# Patient Record
Sex: MALE | Race: WHITE | NOT HISPANIC OR LATINO | Employment: FULL TIME | URBAN - NONMETROPOLITAN AREA
[De-identification: names, ages, dates, MRNs, and addresses within clinical notes are randomized per-mention and may not be internally consistent; named-entity substitution may affect disease eponyms.]

---

## 2017-01-17 ENCOUNTER — HISTORY (OUTPATIENT)
Dept: FAMILY MEDICINE | Facility: OTHER | Age: 27
End: 2017-01-17

## 2017-01-17 ENCOUNTER — OFFICE VISIT - GICH (OUTPATIENT)
Dept: FAMILY MEDICINE | Facility: OTHER | Age: 27
End: 2017-01-17

## 2017-01-17 DIAGNOSIS — L05.91 PILONIDAL CYST WITHOUT ABSCESS: ICD-10-CM

## 2017-01-19 ENCOUNTER — HISTORY (OUTPATIENT)
Dept: SURGERY | Facility: OTHER | Age: 27
End: 2017-01-19

## 2017-01-19 ENCOUNTER — OFFICE VISIT - GICH (OUTPATIENT)
Dept: SURGERY | Facility: OTHER | Age: 27
End: 2017-01-19

## 2017-01-19 DIAGNOSIS — L05.91 PILONIDAL CYST WITHOUT ABSCESS: ICD-10-CM

## 2017-01-20 ENCOUNTER — COMMUNICATION - GICH (OUTPATIENT)
Dept: SURGERY | Facility: OTHER | Age: 27
End: 2017-01-20

## 2017-01-24 ENCOUNTER — OFFICE VISIT - GICH (OUTPATIENT)
Dept: FAMILY MEDICINE | Facility: OTHER | Age: 27
End: 2017-01-24

## 2017-01-24 ENCOUNTER — HISTORY (OUTPATIENT)
Dept: FAMILY MEDICINE | Facility: OTHER | Age: 27
End: 2017-01-24

## 2017-01-24 DIAGNOSIS — L05.91 PILONIDAL CYST WITHOUT ABSCESS: ICD-10-CM

## 2018-01-03 NOTE — PATIENT INSTRUCTIONS
Patient Information     Patient Name MRN Sex Berry Hunt [id number] Male 1990      Patient Instructions by Mike Fonseca MD at 2017 10:57 AM     Author:  Mike Fonseca MD  Service:  (none) Author Type:  Physician     Filed:  2017 10:59 AM  Encounter Date:  2017 Status:  Addendum     :  Mike Fonseca MD (Physician)        Related Notes: Original Note by Mike Fonseca MD (Physician) filed at 2017 10:57 AM            Warm soaks a few times daily  Augmentin twice a day  Off work a couple days  Will try and arrange appointment for Thursday if needed       Index Related topics   Tailbone Cyst (Pilonidal Cyst)   What is a tailbone cyst?   A tailbone cyst is a fluid-filled sac in the crease between the buttocks, near the tailbone. It often contains hair and small pieces of skin. It can get infected and cause an abscess, which is a pocket of pus. Pus is a thick fluid that usually contains white blood cells, dead tissue, and germs.  A tailbone cyst is also called a pilonidal cyst.  What is the cause?  These cysts are often caused by an ingrown hair (hair that grows under the skin instead of on top of the skin). Ingrown hairs happen, for example, because of pressure or friction from tight clothes, sitting, or riding a bicycle for a long time. The ingrown hair irritates the tissue under the skin and causes a cyst to form around the hair.  You may be more likely to have a pilonidal cyst if you were born with a little dimple in the skin between the buttocks.   What are the symptoms?   Symptoms may include:    Pain when you sit    Redness and swelling in the area of your tailbone in or just above the crease between your buttocks    Pus oozing from the swollen area    Tenderness when the swollen area is touched    Rarely fever, weakness, or nausea  The size of the pilonidal cyst may range from a small tender dimple to a large painful area.  How is it diagnosed?  Your  healthcare provider will ask about your symptoms and medical history and examine you.  How is it treated?   You should not try open or squeeze a cyst yourself. This can make the infection worse or cause it to spread.  If there is no infection, your healthcare provider may remove the hair around the cyst and remove any ingrown hair.  If the cyst gets infected, usually it needs to be drained by your healthcare provider. This treats the infection and gets rid of the pressure that causes pain. Even if the cyst is not infected, opening it and draining it is recommended to relieve pain and prevent infection. In some cases your provider may remove the whole cyst. A pilonidal cyst can return after treatment.  Your provider will discuss your choices for treatment.   How can I take care of myself?  Before you see your healthcare provider for treatment, it can help to:    Soak in a tub of warm water for 10 to 15 minutes, several times a day, to lessen pain. Sometimes the cyst may open on its own.    Keep the area clean and dry.    Take nonprescription pain medicine to relieve pain.  After the cyst is drained, make sure that you follow all of your healthcare provider's instructions.  Ask your provider:    How long it will take to recover    If there are activities you should avoid and when you can return to your normal activities    How to take care of yourself at home    What symptoms or problems you should watch for and what to do if you have them  Make sure you know when you should come back for a checkup. Keep all appointments for provider visits or tests.  How can I help prevent a pilonidal cyst?   To help prevent another cyst:    Keep the area between the buttocks dry and clean.    Keep hair out of the area by shaving or using a hair removal cream.  You can get more information from:    The American Society of Colon and Rectal  Surgeons  938.191.6469  https://www.fascrs.org/patients/disease-condition/pilonidal-disease  Developed by [employer].  Adult Advisor 2016.2 published by [employer].  Last modified: 2015-05-08  Last reviewed: 2014-07-23  This content is reviewed periodically and is subject to change as new health information becomes available. The information is intended to inform and educate and is not a replacement for medical evaluation, advice, diagnosis or treatment by a healthcare professional.  References   Adult Advisor 2016.2 Index    Copyright   2016 [employer], a division of [employer] All rights reserved.

## 2018-01-03 NOTE — NURSING NOTE
Patient Information     Patient Name MRN Berry Conn [id number] Male 1990      Nursing Note by Ebony Turner at 2017 10:00 AM     Author:  Ebony Turner Service:  (none) Author Type:  (none)     Filed:  2017 10:29 AM Encounter Date:  2017 Status:  Signed     :  Ebony Turner            Berry Del Cid is a 26 y.o. male presenting with a rectal problem. He reports having painful hemorrhoids.  Ebony Turner LPN 2017 10:19 AM

## 2018-01-03 NOTE — PATIENT INSTRUCTIONS
Patient Information     Patient Name MRN Sex Berry Hunt [id number] Male 1990      Patient Instructions by Daysi Cheng DO at 2017  2:30 PM     Author:  Daysi Cheng DO  Service:  (none) Author Type:  PHYS- Osteopathic     Filed:  2017  2:43 PM  Encounter Date:  2017 Status:  Addendum     :  Dayis Cheng DO (PHYS- Osteopathic)        Related Notes: Original Note by Daysi Cheng DO (PHYS- Osteopathic) filed at 2017  2:57 PM            Caring for Your Incision      -yogurt daily while on antibiotics     Home Care Instructions after Rectal Surgery  Pain/muscle spasms are normal after surgery.  Use the prescribed pain medications, valium and topical creams.  Do not use any other creams unless specifically prescribed for you.  Do not take the pain medication and valium at the same time.  It takes oral pain meds an hour to take effect.  Do not get behind on your pain meds.  You can alternate with NSAID s (ibuprofen 400-600mg) every 8 hours.  Take with food; do not take if you have ulcers or sensitivity to aspirin.  Constipation occurs with the use of narcotic pain medications. The first bowel movement after surgery will be painful.  Do not let yourself get constipated.  Stay on a stool softener while you are on the narcotics.  It is recommended that you use a fiber supplement (Metamucil, Citrucel) daily   (1 tablespoon in 8 oz of water).  If you do not have a bowel movement daily, use Milk of Magnesia or Miralax.  It is normal to have bleeding or drainage after rectal surgery; especially when you move your bowels.  Use a sanitary napkin to collect the discharge. If you are passing large clots or having to change the pad more than every 4 hours, call the clinic or go to the ER.  You may experience spasms in the rectal muscles.  This is normal after surgery and last for about two weeks.  They can become more intense with bowel movements.  .  You can also try  sitz bathes (sitting in a bath tub of PLAIN lukewarm water; soap can add to rectal irritation).  Or you can try ice packs.  You will have to see what works best for you.  It is ok to shower.    Use a pillow to sit on.  Follow a mild bland diet.  Avoid alcohol, spicy food, citrus, and tomatoes.  Avoid strenuous activity (running, jogging, and power walking, swimming, weight lifting) for two weeks.   No driving if taking pain medications, or cannot turn or twist your body without hesitation.  You can return to work when you are no longer taking the pain meds, can sit comfortably for 8 hours or when the weight  restrictions are lifted.  Urinary retention is common. Sit in a warm tub to try to relax the bladder.  If you are unable to urinate after 8 hours, call the office or go to the ER.  If you have packing in place, follow the directions for doing the dressing changes.            Pain Control    Use ice!  Ice keeps the swelling down and swelling is what causes pain.  Never apply ice directly to the skin.  Wrap it in a towel or cloth.  Apply ice 20 minutes on and 20 minutes off for pain control.  Use as needed.    Take tylenol 325 mg by mouth with food every 4 hours as needed for pain. Or ibuprofen 400 mg by mouth with food every 4 hours as needed for pain.  Do not take tylenol if you have a history of heavy drinking , hepatits C or liver problems.  Do not take ibuprofen if you have a history of stomach ulcers/problems, bleeding problem or kidney issues.       Home care  Always wash your hands before touching your incision  Do not to pick at the scabs. Scabs help protect the wound.  You can take a shower in 24 hours and wash the incision with soap and water. Pat dry/don t scrub. It s OK to wash around the incision. But don t spray water directly on it.  Wash with antibacterial soap and water 2-3x/day and after BM.  Check the incision site daily for pain, redness, drainage, swelling, or separation of the incision  edges.  If there is a bandage (dressing) over the incision, leave the dressing in place until you are told to remove it or change it. Using clean hands change the dressing as directed by your healthcare provider. Always wash your hands before changing your dressing.  Make sure any clothing that touches the incision is loose-fitting. This will prevent rubbing. If the incision is on the head, keep your child from wearing caps or other head coverings. These may rub against the incision.  Try to avoid from rough play, contact sports, or physical activities. This can put you at risk of opening the incision.  Make sure you avoid doing things that could cause dirt or sweat to get in or on the incision.  As your incision heals, the skin may appear pink or red. It may also feel slightly bumpy or raised. This is called a healing ridge. Over time, the color should fade and the raised skin will become less noticeable.            Call your healthcare provider right away if you have any of these:  More pain, redness, swelling, bleeding, or foul-smelling discharge around the incision area  Fever of 101 F (38.3 C) or higher, or as directed by your child's healthcare provider  Shaking chills  Vomiting or nausea that doesn t go away  Numbness, coldness, or tingling around the incision area, or changes in skin color

## 2018-01-03 NOTE — PROGRESS NOTES
Patient Information     Patient Name MRN Sex Berry Hunt [id number] Male 1990      Progress Notes by Mike Fonseca MD at 2017 10:00 AM     Author:  Mike Fonseca MD Service:  (none) Author Type:  Physician     Filed:  2017  2:52 PM Encounter Date:  2017 Status:  Signed     :  Mike Fonseca MD (Physician)            SUBJECTIVE:  26 y.o. male presents for rectal area pain for 2 d. No fever. He is uncomfortable and does not want to sit.    Had a hemorrhoid before, thinks it is like that. No rectal bleeding.    No current outpatient prescriptions on file.     Allergies as of 2017      (No Known Allergies)       OBJECTIVE:  Visit Vitals       /70     Pulse 68     Wt 99.8 kg (220 lb)     BMI 32.49 kg/m2       General Appearance: uncomfortable with pain  Rectal Exam: Exam difficult due to discomfort. Redness and slight erythema in superior gluteal fold. No head to abscess noted. I cannot see well enough to see proximity to rectum.  Given Toradol 60 mg IM. Eventually able to examine more thoroughly and it does not appear to be a perirectal abscess. More likely pilonidal cyst.    ASSESSMENT/PLAN:    ICD-10-CM   1. Infected pilonidal cyst L05.91     Appears to have early infected pilonidal cyst. Nothing to I&D today.   Placed on Augmentin. Warm soaks to help increase blood flow to the area with antibiotics   Likely to form abscess and recommend follow up in 2-3 d to reassess.   I am not in clinic at the end of the week, so arranged follow up with general surgery in case he requires I&D.

## 2018-01-03 NOTE — NURSING NOTE
Patient Information     Patient Name MRN Sex Berry Hunt [id number] Male 1990      Nursing Note by Ebony Turner at 2017  2:15 PM     Author:  Ebony Turner Service:  (none) Author Type:  (none)     Filed:  2017  2:30 PM Encounter Date:  2017 Status:  Signed     :  Ebony Turner            Berry Del Cid is a 26 y.o. male presenting for follow up on his surgery. He needs a note to return to work.  Ebony Turner LPN 2017 2:21 PM

## 2018-01-03 NOTE — PROGRESS NOTES
Patient Information     Patient Name MRN Sex Berry Hunt [id number] Male 1990      Progress Notes by Daysi Cheng DO at 2017  2:30 PM     Author:  Daysi Cheng DO Service:  (none) Author Type:  PHYS- Osteopathic     Filed:  2017  2:44 PM Encounter Date:  2017 Status:  Signed     :  Daysi Cheng DO (PHYS- Osteopathic)            OFFICE CONSULTATION NOTE  Patient Name: Berry Del Cid  Address: [address] [place] [id number]  Age:26 y.o.  Sex: male     Primary Care Physician: jose ramon COLLIER was requested to see this patient in consultation by No Pcp for evaluation of infected pilonidal cyst    HPI:   The patient is an 26 y.o. male here today in clinic for evaluation of infected pilonidal cyst  Started on .  Has been on augmentin for days.  slt better.  Never had this problems before/.  No on blood thinners.  No diabetes mellitus . No recent steriods.     CONSULTATION ASSESSMENT AND PLAN/RECOMMENDATIONS:   Problem List Items Addressed This Visit     None      Visit Diagnoses     Infected pilonidal cyst        Relevant Medications    HYDROcodone-acetaminophen, 5-325 mg, (NORCO) per tablet          Clinic Procedure Note      PMx, PSx, and social Hx are reviewed and updated in [facility]    Current Outpatient Prescriptions on File Prior to Visit       Medication  Sig Dispense Refill     amoxicillin-clavulanate 875-125 mg tablet (AUGMENTIN) Take 1 tablet by mouth 2 times daily with meals for 7 days. 14 tablet 0     No current facility-administered medications on file prior to visit.        Indication  Berry Del Cid is seen at the request of the  Pcp.  Berry Del Cid  presents for lesion removal. We have discussed this procedure, including option  of not performing surgery, technique of surgery and potential for  bleeding/infection/scarring/need for removal of more tissue and post-procedural care.  Patient is able to do post procedural dressing changes  and understands what is  expected.    OBJECTIVE:    Berry Del Cid appears well. Vitals are normal. The patient has no allergies to lidocaine or  suture material. The patient not on any blood thinners.  Informed consent was obtained prior to beginning the procedure. The area was marked  and doubled checked/ID ed with the pt. PAUSE for the CAUSE completed. The risks of  lesion removal include but are not limited to: bleeding, infection, scarring, reoccurrence,  and the need for removal of more tissue, and complications of anesthesia.    Location  ASSESSMENT: The lesion is 4x2Cm in size. Color:red/hot/swollen/painful    Borders: irreg  Differential diagnosis includes:infected pilonidail cyst    Location:   cleft     Procedural Sedation  1% lidocaine w/ Epinephrine  20 cc    Technique  Patient appears well. Vitals are normal. The patient has no allergies to lidocaine or  suture material. The patient not on any blood thinners.  Berry Del Cid has no history of keloids or problems with healing in the past.    Skin: see HPI    Informed consent was obtained prior to beginning the procedure. The area was marked  and doubled checked/ID ed with the pt. PAUSE for the CAUSE completed. The risks of  lesion removal include but are not limited to: bleeding, infection, scarring, reoccurrence,  and the need for removal of more tissue, and complications of anesthesia.  After informed consent was obtained, using Chloroprep for cleansing and 1%   Lidocaine w/ epi for anesthetic; using sterile technique, PROCEDURE: I & D of asbcess  was performed.    The lesion is 4x2cm in size. The incision was 2Cm long.  Packed wet top dry.  Patient did not tolerate this well.     Patient is on antibiotics.  and wound care instructions provided. The procedure was well tolerated without complications.    Plan:  The patient will follow up in 7 days for wound check  If thereis any bleeding/redness/drainage/swelling/pain or tenderness- call the  clinic. Patient  was given instructions in wound care, acivity, warning signs, appointment for follow up.  If the patient has any questions or concerns, should call our clinic or go to ER/urgent  care after hours. Patient expressed understanding in directions for care, and was given a  written copy of instructions.    Rx=see EPIC    Pt tolerated the procedure well without complications  Patient was given instruction in activity restrictions, wound care and dressing changes. Medication usage, diet, warning signs to look for (and what to do if they occur), and an appointment for follow-up.      Caring for Your Incision          .    Home care  Always wash your hands before touching your incision.    Do not to pick at the scabs. Scabs help protect the wound.  You can take a shower in 24 hours and wash the incision with soap and water. Pat dry/don t scrub. It s OK to wash around the incision. But don t spray water directly on it.  Pat stitches dry if they get wet. Don't rub.  Check the incision site daily for pain, redness, drainage, swelling, or separation of the incision edges.  If there is a bandage (dressing) over the incision, leave the dressing in place until you are told to remove it or change it. Using clean hands change the dressing as directed by your healthcare provider. Always wash your hands before changing your dressing.  Make sure any clothing that touches the incision is loose-fitting. This will prevent rubbing.   Try to avoid from rough play, contact sports, or physical activities. This can put you at risk of opening the incision.  Make sure you avoid doing things that could cause dirt or sweat to get in or on the incision.  As your incision heals, the skin may appear pink or red. It may also feel slightly bumpy or raised. This is called a healing ridge. Over time, the color should fade and the raised skin will become less noticeable.            Pain Control    Use ice!  Ice keeps the swelling down and  "swelling is what causes pain.  Never apply ice directly to the skin.  Wrap it in a towel or cloth.  Apply ice 20 minutes on and 20 minutes off for pain control.  Use as needed.    Take tylenol 325 mg by mouth with food every 4 hours as needed for pain. Or ibuprofen 400 mg by mouth with food every 4 hours as needed for pain.  Do not take tylenol if you have a history of heavy drinking , hepatits C or liver problems.  Do not take ibuprofen if you have a history of stomach ulcers/problems, bleeding problem or kidney issues.         Wash with soap and water 2-3 times per day and after bowel movement  Remove packing in the am  Yogurt BID while on antibiotics  Follow up 1 week  Will require definitive surgery.  Patient has work issues and can't take time off for 6m.  Can do after that point.                PAST MEDICAL HISTORY  No past medical history on file.   PAST SURGICAL HISTORY  No past surgical history on file.   CURRENT MEDS  Current Outpatient Prescriptions on File Prior to Visit       Medication  Sig Dispense Refill     amoxicillin-clavulanate 875-125 mg tablet (AUGMENTIN) Take 1 tablet by mouth 2 times daily with meals for 7 days. 14 tablet 0     No current facility-administered medications on file prior to visit.      ALLERGIES/SENSITIVITIES  No Known Allergies  FAMILY HISTORY  No family history on file.   SOCIAL HISTORY  Social History     Social History        Marital status:       Spouse name: N/A     Number of children:  N/A     Years of education:  N/A     Occupational History      Not on file.     Social History Main Topics       Smoking status: Never Smoker     Smokeless tobacco: Current User     Alcohol use Not on file     Drug use: Not on file     Sexual activity: Not on file     Other Topics  Concern     Not on file      Social History Narrative     **pre upload 02/04/2013**        PHYSICAL EXAM  /80  Ht 1.753 m (5' 9\")  Wt 102.1 kg (225 lb)  BMI 33.23 kg/m2    GENERAL: Healthy appearing " patient in no acute distress. Pleasant and cooperative with exam and interview.   HEENT: Head-normocephalic. Eyes-no scleral icterus, pupils equal, round, and reactive to light. Nose-no nasal drainage. No lesions. Mouth-oral mucosa pink and moist, no lesions.    LYMPHATICS:  No cervical, axillary or supraclavicular adenopathy.  CV: Regular rate and rhythm, no murmurs. No peripheral edema.  LUNGS:  No respiratory distress. Clear bilaterally to auscultation.  ABDOMEN: Non distended. Bowel sounds active. Soft, non-tender, no hepatosplenomegaly or hernias. No peritoneal signs.  rectum infected infected pilonidal cyst 4x2cm redness/hot/painfeull/swollen    SKIN: Pink, warm and dry. No jaundice. No rash.  NEURO:  Cranial nerves II-XII grossly intact. Alert and oriented.  PSYCH: Appropriate mood and affect.      No results found for any previous visit.        IMAGING  none    Daysi Cheng, DO

## 2018-01-03 NOTE — PROGRESS NOTES
Patient Information     Patient Name MRN Sex Berry Hunt [id number] Male 1990      Progress Notes by Mike Fonseca MD at 2017  2:15 PM     Author:  Mike Fonseca MD Service:  (none) Author Type:  Physician     Filed:  2017  4:57 PM Encounter Date:  2017 Status:  Signed     :  Mike Fonseca MD (Physician)            SUBJECTIVE:  26 y.o. male here for follow up on pilonidal cyst. He received antibiotics a week ago. Completing Augmentin today. Had I&D 5 d ago. No drainage. Wondering about exercise and return to work    REVIEW OF SYSTEMS:    Constitutional: Negative        Current Outpatient Prescriptions       Medication  Sig Dispense Refill     amoxicillin-clavulanate 875-125 mg tablet (AUGMENTIN) Take 1 tablet by mouth 2 times daily with meals for 7 days. 14 tablet 0     Allergies as of 2017      (No Known Allergies)       OBJECTIVE:  Visit Vitals       /80     Pulse 64     Wt 102.5 kg (226 lb)     BMI 33.37 kg/m2       Skin: Healing abscess/open incision on L upper gluteal fold. No surrounding erythema. Has some drainage matted together.      ASSESSMENT/PLAN:    ICD-10-CM   1. Infected pilonidal cyst L05.91     Resolving. Complete antibiotics, no further indicated. Showering and bath okay, but stay out of hot tub and pool until closed wound.  Given note to return to work. May want to avoid running or lots of chafing for another week or so for healing.     F/U PRN

## 2018-01-03 NOTE — TELEPHONE ENCOUNTER
Patient Information     Patient Name MRN Sex Berry Hunt [id number] Male 1990      Telephone Encounter by Heather Rodriguez at 2017  8:59 AM     Author:  Heather Rodriguez Service:  (none) Author Type:  (none)     Filed:  2017  9:01 AM Encounter Date:  2017 Status:  Signed     :  Heather Rodriguez            Patient needs to be seen sooner than Thursday.  Dr Cheng told patient to come in on Tuesday.   Pt is suppose to work Thursday the day shift.   Do you want to have him see some another provider or can you work in on Tuesday?  Please call wife Magali.  Thanks    Heather Rodriguez ....................  2017   9:01 AM

## 2018-01-03 NOTE — TELEPHONE ENCOUNTER
Patient Information     Patient Name MRN Sex     Berry Del Cid [id number] Male 1990      Telephone Encounter by Maci Mullins at 2017  9:18 AM     Author:  Maci Mullins Service:  (none) Author Type:  (none)     Filed:  2017  9:19 AM Encounter Date:  2017 Status:  Signed     :  Maci Mullins            Schedulers will call patient so he can be seen in the afternoon on, 17 with Dr. Cheng for follow up infected pilonidal.  Maci Mullins LPN..........2017  9:18 AM

## 2018-01-23 ENCOUNTER — HOSPITAL ENCOUNTER (OUTPATIENT)
Dept: RADIOLOGY | Facility: OTHER | Age: 28
End: 2018-01-23
Attending: INTERNAL MEDICINE

## 2018-01-23 ENCOUNTER — HISTORY (OUTPATIENT)
Dept: INTERNAL MEDICINE | Facility: OTHER | Age: 28
End: 2018-01-23

## 2018-01-23 ENCOUNTER — OFFICE VISIT - GICH (OUTPATIENT)
Dept: INTERNAL MEDICINE | Facility: OTHER | Age: 28
End: 2018-01-23

## 2018-01-23 DIAGNOSIS — M25.579 PAIN IN ANKLE: ICD-10-CM

## 2018-01-23 ASSESSMENT — PATIENT HEALTH QUESTIONNAIRE - PHQ9: SUM OF ALL RESPONSES TO PHQ QUESTIONS 1-9: 0

## 2018-01-25 ENCOUNTER — DOCUMENTATION ONLY (OUTPATIENT)
Dept: FAMILY MEDICINE | Facility: OTHER | Age: 28
End: 2018-01-25

## 2018-01-25 PROBLEM — L05.91 INFECTED PILONIDAL CYST: Status: ACTIVE | Noted: 2017-01-20

## 2018-01-27 VITALS — WEIGHT: 226 LBS | DIASTOLIC BLOOD PRESSURE: 80 MMHG | HEART RATE: 64 BPM | SYSTOLIC BLOOD PRESSURE: 120 MMHG

## 2018-01-27 VITALS
HEIGHT: 69 IN | BODY MASS INDEX: 33.33 KG/M2 | DIASTOLIC BLOOD PRESSURE: 70 MMHG | WEIGHT: 225 LBS | SYSTOLIC BLOOD PRESSURE: 110 MMHG | BODY MASS INDEX: 32.49 KG/M2 | SYSTOLIC BLOOD PRESSURE: 102 MMHG | WEIGHT: 220 LBS | HEART RATE: 68 BPM | DIASTOLIC BLOOD PRESSURE: 80 MMHG

## 2018-02-09 VITALS
SYSTOLIC BLOOD PRESSURE: 136 MMHG | BODY MASS INDEX: 33.2 KG/M2 | WEIGHT: 224.8 LBS | DIASTOLIC BLOOD PRESSURE: 82 MMHG | HEART RATE: 64 BPM

## 2018-02-11 ASSESSMENT — PATIENT HEALTH QUESTIONNAIRE - PHQ9: SUM OF ALL RESPONSES TO PHQ QUESTIONS 1-9: 0

## 2018-02-13 NOTE — PROGRESS NOTES
Patient Information     Patient Name MRN Sex Berry Hunt [id number] Male 1990      Progress Notes by Piyush Kilgore MD at 2018  2:40 PM     Author:  Piyush Kilgore MD Service:  (none) Author Type:  Physician     Filed:  2018  3:10 PM Encounter Date:  2018 Status:  Signed     :  Piyush Kilgore MD (Physician)            SUBJECTIVE:    Berry Del Cid is a 27 y.o. male who presents for ankle pain.    HPI Comments: He is here today with complaint of pain in his left ankle. This is been for the last week and a half or so. He admits that he's been doing a lot more at the gym lately including a lot of lower body exertional exercises and he has also increased his routine on the treadmill. He has not increased the incline but he has increased the distance and the speed of the treadmill. It is now painful just to have a blood on. It doesn't necessarily hurt anymore with standing. He hasn't tried anything for this. He is here today to have this evaluated.      No Known Allergies,   No current outpatient prescriptions on file.     No current facility-administered medications for this visit.      Medications have been reviewed by me and are current to the best of my knowledge and ability. ,   Past Medical History:     Diagnosis  Date     Infected pilonidal cyst 2017    and   Patient Active Problem List      Diagnosis Date Noted     Infected pilonidal cyst 2017       REVIEW OF SYSTEMS:  Review of Systems   All other systems reviewed and are negative.      OBJECTIVE:  /82 (Cuff Site: Right Arm, Position: Sitting, Cuff Size: Adult Large)  Pulse 64  Wt 102 kg (224 lb 12.8 oz)  BMI 33.2 kg/m2    EXAM:   Physical Exam   Constitutional: He is well-developed, well-nourished, and in no distress. No distress.   Musculoskeletal:        Feet:    Skin: He is not diaphoretic.   Nursing note and vitals reviewed.      ASSESSMENT/PLAN:    ICD-10-CM    1. Ankle pain, unspecified  chronicity, unspecified laterality M25.579 XR ANKLE 3 VIEWS LEFT      meloxicam (MOBIC) 15 mg tablet        Plan:  X-ray today is negative. I think he probably has an overuse injury with tendinitis or similar. This was reviewed with him. He needs to back off on the exercise until this heals. Mobic 15 mg daily as well as ice or heat to the area. Return if not improving.

## 2018-02-13 NOTE — NURSING NOTE
Patient Information     Patient Name MRN Sex Berry Hunt [id number] Male 1990      Nursing Note by Ivana Fernandez LPN at 2018  2:40 PM     Author:  Ivana Fernandez LPN Service:  (none) Author Type:  NURS- Licensed Practical Nurse     Filed:  2018  2:47 PM Encounter Date:  2018 Status:  Signed     :  Ivana Fernandez LPN (NURS- Licensed Practical Nurse)            The patient is here today to have his left ankle looked at. He reports it is painful 5/10.  Ivana Fernandez LPN......2018  2:38 PM

## 2018-03-20 ENCOUNTER — OFFICE VISIT (OUTPATIENT)
Dept: FAMILY MEDICINE | Facility: OTHER | Age: 28
End: 2018-03-20
Attending: FAMILY MEDICINE
Payer: COMMERCIAL

## 2018-03-20 VITALS
BODY MASS INDEX: 32.58 KG/M2 | WEIGHT: 220 LBS | HEIGHT: 69 IN | DIASTOLIC BLOOD PRESSURE: 80 MMHG | SYSTOLIC BLOOD PRESSURE: 128 MMHG | HEART RATE: 74 BPM

## 2018-03-20 DIAGNOSIS — L05.91 PILONIDAL CYST: Primary | ICD-10-CM

## 2018-03-20 PROCEDURE — 99213 OFFICE O/P EST LOW 20 MIN: CPT | Performed by: FAMILY MEDICINE

## 2018-03-20 RX ORDER — MELOXICAM 15 MG/1
15 TABLET ORAL DAILY
COMMUNITY
Start: 2018-01-23 | End: 2018-03-20

## 2018-03-20 NOTE — MR AVS SNAPSHOT
After Visit Summary   3/20/2018    Berry Del Cid    MRN: [id number]           Patient Information     Date Of Birth          1990        Visit Information        Provider Department      3/20/2018 3:15 PM Raeann Raygoza MD [facility]        Today's Diagnoses     Pilonidal cyst    -  1       Follow-ups after your visit        Additional Services     GENERAL SURG ADULT REFERRAL       Your provider has referred you to: GI: [facility] [phone]   [email or web address]    Please be aware that coverage of these services is subject to the terms and limitations of your health insurance plan.  Call member services at your health plan with any benefit or coverage questions.      Please bring the following with you to your appointment:    (1) Any X-Rays, CTs or MRIs which have been performed.  Contact the facility where they were done to arrange for  prior to your scheduled appointment.   (2) List of current medications   (3) This referral request   (4) Any documents/labs given to you for this referral                  Who to contact     If you have questions or need follow up information about today's clinic visit or your schedule please contact [facility] directly at [phone].  Normal or non-critical lab and imaging results will be communicated to you by MyChart, letter or phone within 4 business days after the clinic has received the results. If you do not hear from us within 7 days, please contact the clinic through MyChart or phone. If you have a critical or abnormal lab result, we will notify you by phone as soon as possible.  Submit refill requests through [employer] or call your pharmacy and they will forward the refill request to us. Please allow 3 business days for your refill to be completed.          Additional Information About Your Visit        MyChart Information     MyChart  "lets you send messages to your doctor, view your test results, renew your prescriptions, schedule appointments and more. To sign up, go to [email or web address] . Click on \"Log in\" on the left side of the screen, which will take you to the Welcome page. Then click on \"Sign up Now\" on the right side of the page.     You will be asked to enter the access code listed below, as well as some personal information. Please follow the directions to create your username and password.     Your access code is: [email or web address]  Expires: 2018  3:48 PM     Your access code will  in 90 days. If you need help or a new code, please call your [facility] clinic or [phone].        Care EveryWhere ID     This is your Care EveryWhere ID. This could be used by other organizations to access your [facility] medical records  [id number]        Your Vitals Were     Pulse Height BMI (Body Mass Index)             74 5' 9\" (1.753 m) 32.49 kg/m2          Blood Pressure from Last 3 Encounters:   18 128/80   18 136/82   17 120/80    Weight from Last 3 Encounters:   18 220 lb (99.8 kg)   18 224 lb 12.8 oz (102 kg)   17 226 lb (102.5 kg)              We Performed the Following     GENERAL SURG ADULT REFERRAL          Today's Medication Changes          These changes are accurate as of 3/20/18  3:48 PM.  If you have any questions, ask your nurse or doctor.               Start taking these medicines.        Dose/Directions    amoxicillin-clavulanate 875-125 MG per tablet   Commonly known as:  AUGMENTIN   Used for:  Pilonidal cyst   Started by:  Raeann Raygoza MD        Dose:  1 tablet   Take 1 tablet by mouth 2 times daily for 10 days   Quantity:  20 tablet   Refills:  0            Where to get your medicines      Some of these will need a paper prescription and others can be bought over the counter.  Ask your nurse if you have questions.     Bring a paper prescription for each of these " medications     amoxicillin-clavulanate 875-125 MG per tablet                Primary Care Provider Fax #    Physician No Ref-Primary [phone]       No address on file        Equal Access to Services     JEFERSON PEREZ : Hadii aad ku haddalia Mcmanus, maribell muhammad, kahlil kashelbi moore, be garner laYasmanijennifer jason. So [facility] [phone].    ATENCIÓN: Si habla español, tiene a maciel disposición servicios gratuitos de asistencia lingüística. Llame al [phone].    We comply with applicable federal civil rights laws and [place] laws. We do not discriminate on the basis of race, color, national origin, age, disability, sex, sexual orientation, or gender identity.            Thank you!     Thank you for choosing [facility] AND [facility]  for your care. Our goal is always to provide you with excellent care. Hearing back from our patients is one way we can continue to improve our services. Please take a few minutes to complete the written survey that you may receive in the mail after your visit with us. Thank you!             Your Updated Medication List - Protect others around you: Learn how to safely use, store and throw away your medicines at www.disposemymeds.org.          This list is accurate as of 3/20/18  3:48 PM.  Always use your most recent med list.                   Brand Name Dispense Instructions for use Diagnosis    amoxicillin-clavulanate 875-125 MG per tablet    AUGMENTIN    20 tablet    Take 1 tablet by mouth 2 times daily for 10 days    Pilonidal cyst

## 2018-03-20 NOTE — PROGRESS NOTES
"  SUBJECTIVE:   Berry Del Cid is a 27 year old male who presents to clinic today for possible recurrent pilonidal cyst.  First started having issues with it about a year ago.  Had it lanced last year.  He was not able to sit at all at that time.  He is in the  and has been doing a lot of sit ups.  Wonders if this has caused it to flare again lately.  No fever.  Has noted some bloody drainage in underwear again lately.  A little tender now, but not like it had been before.    HPI      Patient Active Problem List    Diagnosis Date Noted     Infected pilonidal cyst 01/20/2017     Priority: Medium     Past Medical History:   Diagnosis Date     Pilonidal cyst without abscess     1/20/2017      History reviewed. No pertinent surgical history.  History reviewed. No pertinent family history.  Social History   Substance Use Topics     Smoking status: Never Smoker     Smokeless tobacco: Current User     Alcohol use Not on file     Social History     Social History Narrative    **pre upload 02/04/2013**     Current Outpatient Prescriptions   Medication Sig Dispense Refill     amoxicillin-clavulanate (AUGMENTIN) 875-125 MG per tablet Take 1 tablet by mouth 2 times daily for 10 days 20 tablet 0     No Known Allergies    Review of Systems   Constitutional: Negative for activity change, appetite change and fever.        OBJECTIVE:     /80 (BP Location: Right arm, Patient Position: Sitting, Cuff Size: Adult Large)  Pulse 74  Ht 5' 9\" (1.753 m)  Wt 220 lb (99.8 kg)  BMI 32.49 kg/m2  Body mass index is 32.49 kg/(m^2).  Physical Exam   Constitutional: He appears well-developed.   Musculoskeletal:   There is some dried blood in gluteal cleft. He does not allow me to do an adequate exam due to pain.       PHQ-2 Score:     PHQ-2 ( 1999 Pfizer) 3/20/2018   Q1: Little interest or pleasure in doing things 0   Q2: Feeling down, depressed or hopeless 0   PHQ-2 Score 0         Diagnostic Test Results:  none "     ASSESSMENT/PLAN:       ICD-10-CM    1. Pilonidal cyst L05.91 GENERAL SURG ADULT REFERRAL     amoxicillin-clavulanate (AUGMENTIN) 875-125 MG per tablet       1.  Referred to general surgery for consideration of excision of cyst.  He was given a new prescription for Augmentin to have on hand if getting worse.    Raeann Raygoza MD  [facility]

## 2018-03-21 ENCOUNTER — OFFICE VISIT (OUTPATIENT)
Dept: SURGERY | Facility: OTHER | Age: 28
End: 2018-03-21
Attending: FAMILY MEDICINE
Payer: COMMERCIAL

## 2018-03-21 VITALS — WEIGHT: 220.4 LBS | DIASTOLIC BLOOD PRESSURE: 80 MMHG | SYSTOLIC BLOOD PRESSURE: 136 MMHG | BODY MASS INDEX: 32.55 KG/M2

## 2018-03-21 DIAGNOSIS — L05.91 PILONIDAL CYST: ICD-10-CM

## 2018-03-21 DIAGNOSIS — L05.91 INFECTED PILONIDAL CYST: Primary | ICD-10-CM

## 2018-03-21 PROCEDURE — 99212 OFFICE O/P EST SF 10 MIN: CPT | Performed by: SURGERY

## 2018-03-21 ASSESSMENT — ENCOUNTER SYMPTOMS
ACTIVITY CHANGE: 0
FEVER: 0
APPETITE CHANGE: 0

## 2018-03-21 ASSESSMENT — PAIN SCALES - GENERAL: PAINLEVEL: NO PAIN (0)

## 2018-03-21 NOTE — PROGRESS NOTES
Surgical Clinic Consult  Referring physician:  Dr Raygoza  Primary physician:     No Ref-Primary, Physician    Chief complaint:   Infected pilonidal cyst    History of present illness:  This is a 27 year old male I am seeing in consultation for an infected pilonidal cyst.  Patient had a pilonidal cyst incised and drained in January 2017.  Over the past week patient has had progressive pain and drainage.  He is preparing for a fitness test in May.  He is very frightened about thoughts of an open wound due to the pain of the drainage that occurred last year.    Past medical history:   Past Medical History:   Diagnosis Date     Pilonidal cyst without abscess     1/20/2017       Pastsurgical history:  No past surgical history on file.    Current medications:  Current Outpatient Prescriptions   Medication Sig Dispense Refill     amoxicillin-clavulanate (AUGMENTIN) 875-125 MG per tablet Take 1 tablet by mouth 2 times daily for 10 days 20 tablet 0       Allergies:  No Known Allergies    Family history:  No family history on file.    Social history:  Social History     Social History     Marital status:      Spouse name: N/A     Number of children: N/A     Years of education: N/A     Occupational History     Not on file.     Social History Main Topics     Smoking status: Never Smoker     Smokeless tobacco: Current User     Alcohol use Not on file     Drug use: Not on file     Sexual activity: Not on file     Other Topics Concern     Not on file     Social History Narrative    **pre upload 02/04/2013**       PROBLEM LIST:  Patient Active Problem List   Diagnosis     Infected pilonidal cyst     Review of systems:  COMPLETE REVIEW OF SYSTEMS: Denies problems  Gastrointestinal: Denies problems  Cardiovascular: Denies problems  Respiratory: Denies problems  Genitourinary: Denies problems  Musculoskeletal: Denies problems  Skin: See HPI  Neurologic: Denies problems  Psychiatric: Denies problems  Endocrine: Denies  problems  Heme/Lymphatic: Denies problems  Vascular: Denies problems      Physical exam: /80 (BP Location: Right arm, Patient Position: Sitting, Cuff Size: Adult Large)  Wt 220 lb 6.4 oz (100 kg)  BMI 32.55 kg/m2      General: this is a pleasant male patient in no acute distress.  Patient is awake alert and oriented x3 .   Respiratory: Clear without wheezes or crackles  Cardiovascular: Regular rate and rhythm without murmurs  Sacrum: Hypertrophic granulation tissue just to the right of midline with purulent exudate.  Midline pits.  Area tender but without erythema.    Assessment:   Infected pilonidal cyst/sinus.  We discussed alternatives and treatment and I recommended open procedure to  heal by secondary intention as the area is very inflamed/infected.     Plan:    Pilonidal cystectomy under general anesthesia as a day surgery.  Patient understands the risks to include bleeding, infection, recurrence, and the need to heal by secondary intention and wishes to proceed.       Francisco Javier Suero MD FACS

## 2018-03-21 NOTE — NURSING NOTE
Patient presents to clinic to have a consult for a pilonidal cyst.  Deidre Pineda LPN  3/21/2018  3:55 PM

## 2018-03-21 NOTE — MR AVS SNAPSHOT
"              After Visit Summary   3/21/2018    Berry Del Cid    MRN: [id number]           Patient Information     Date Of Birth          1990        Visit Information        Provider Department      3/21/2018 3:50 PM Francisco Javier Suero MD [facility]        Today's Diagnoses     Infected pilonidal cyst    -  1    Pilonidal cyst           Follow-ups after your visit        Your next 10 appointments already scheduled     Apr 04, 2018  3:30 PM CDT   Return Visit with Francisco Javier Suero MD   [facility]    [address]  [place] [address] [phone]              Who to contact     If you have questions or need follow up information about today's clinic visit or your schedule please contact [facility] directly at [phone].  Normal or non-critical lab and imaging results will be communicated to you by [employer], letter or phone within 4 business days after the clinic has received the results. If you do not hear from us within 7 days, please contact the clinic through [employer] or phone. If you have a critical or abnormal lab result, we will notify you by phone as soon as possible.  Submit refill requests through [employer] or call your pharmacy and they will forward the refill request to us. Please allow 3 business days for your refill to be completed.          Additional Information About Your Visit        MyChart Information     [employer] lets you send messages to your doctor, view your test results, renew your prescriptions, schedule appointments and more. To sign up, go to [email or web address] . Click on \"Log in\" on the left side of the screen, which will take you to the Welcome page. Then click on \"Sign up Now\" on the right side of the page.     You will be asked to enter the access code listed below, as well as some personal information. Please follow the directions to create your username and " password.     Your access code is: [email or web address]  Expires: 2018  3:48 PM     Your access code will  in 90 days. If you need help or a new code, please call your [facility] or [phone].        Care EveryWhere ID     This is your Care EveryWhere ID. This could be used by other organizations to access your [facility] medical records  [id number]        Your Vitals Were     BMI (Body Mass Index)                   32.55 kg/m2            Blood Pressure from Last 3 Encounters:   18 136/80   18 128/80   18 136/82    Weight from Last 3 Encounters:   18 220 lb 6.4 oz (100 kg)   18 220 lb (99.8 kg)   18 224 lb 12.8 oz (102 kg)              Today, you had the following     No orders found for display       Primary Care Provider Fax #    Physician No Ref-Primary [phone]       No address on file        Equal Access to Services     ESTELA [facility] : Hadii amanda ku hadasho Soomaali, waaxda luqadaha, qaybta kaalmada adeegyada, waxay betiin ray arteaga . So [facility] [phone].    ATENCIÓN: Si habla español, tiene a maciel disposición servicios gratuitos de asistencia lingüística. Llame al [phone].    We comply with applicable federal civil rights laws and [place] laws. We do not discriminate on the basis of race, color, national origin, age, disability, sex, sexual orientation, or gender identity.            Thank you!     Thank you for choosing [facility] [place]  for your care. Our goal is always to provide you with excellent care. Hearing back from our patients is one way we can continue to improve our services. Please take a few minutes to complete the written survey that you may receive in the mail after your visit with us. Thank you!             Your Updated Medication List - Protect others around you: Learn how to safely use, store and throw away your medicines at www.disposemymeds.org.          This list is accurate as of 3/21/18  5:06 PM.   Always use your most recent med list.                   Brand Name Dispense Instructions for use Diagnosis    amoxicillin-clavulanate 875-125 MG per tablet    AUGMENTIN    20 tablet    Take 1 tablet by mouth 2 times daily for 10 days    Pilonidal cyst

## 2018-03-26 ENCOUNTER — ANESTHESIA EVENT (OUTPATIENT)
Dept: SURGERY | Facility: OTHER | Age: 28
End: 2018-03-26
Payer: COMMERCIAL

## 2018-03-27 ENCOUNTER — SURGERY (OUTPATIENT)
Age: 28
End: 2018-03-27

## 2018-03-27 ENCOUNTER — MYC MEDICAL ADVICE (OUTPATIENT)
Dept: SURGERY | Facility: OTHER | Age: 28
End: 2018-03-27

## 2018-03-27 ENCOUNTER — ANESTHESIA (OUTPATIENT)
Dept: SURGERY | Facility: OTHER | Age: 28
End: 2018-03-27
Payer: COMMERCIAL

## 2018-03-27 ENCOUNTER — HOSPITAL ENCOUNTER (OUTPATIENT)
Facility: OTHER | Age: 28
Discharge: HOME | End: 2018-03-27
Attending: SURGERY | Admitting: SURGERY
Payer: COMMERCIAL

## 2018-03-27 VITALS
DIASTOLIC BLOOD PRESSURE: 88 MMHG | TEMPERATURE: 97.7 F | RESPIRATION RATE: 17 BRPM | HEART RATE: 70 BPM | OXYGEN SATURATION: 95 % | SYSTOLIC BLOOD PRESSURE: 131 MMHG

## 2018-03-27 DIAGNOSIS — L05.91 INFECTED PILONIDAL CYST: Primary | ICD-10-CM

## 2018-03-27 PROCEDURE — 37000009 ZZH ANESTHESIA TECHNICAL FEE, EACH ADDTL 15 MIN: Performed by: SURGERY

## 2018-03-27 PROCEDURE — 11770 EXC PILONIDAL CYST SIMPLE: CPT | Performed by: ANESTHESIOLOGY

## 2018-03-27 PROCEDURE — 27210794 ZZH OR GENERAL SUPPLY STERILE: Performed by: SURGERY

## 2018-03-27 PROCEDURE — 11770 EXC PILONIDAL CYST SIMPLE: CPT | Performed by: SURGERY

## 2018-03-27 PROCEDURE — 25000128 H RX IP 250 OP 636: Performed by: NURSE ANESTHETIST, CERTIFIED REGISTERED

## 2018-03-27 PROCEDURE — 40000306 ZZH STATISTIC PRE PROC ASSESS II: Performed by: SURGERY

## 2018-03-27 PROCEDURE — 25000125 ZZHC RX 250: Performed by: SURGERY

## 2018-03-27 PROCEDURE — 71000014 ZZH RECOVERY PHASE 1 LEVEL 2 FIRST HR: Performed by: SURGERY

## 2018-03-27 PROCEDURE — 27210995 ZZH RX 272: Performed by: SURGERY

## 2018-03-27 PROCEDURE — 71000027 ZZH RECOVERY PHASE 2 EACH 15 MINS: Performed by: SURGERY

## 2018-03-27 PROCEDURE — 36000052 ZZH SURGERY LEVEL 2 EA 15 ADDTL MIN: Performed by: SURGERY

## 2018-03-27 PROCEDURE — 25000125 ZZHC RX 250: Performed by: NURSE ANESTHETIST, CERTIFIED REGISTERED

## 2018-03-27 PROCEDURE — 11770 EXC PILONIDAL CYST SIMPLE: CPT | Performed by: NURSE ANESTHETIST, CERTIFIED REGISTERED

## 2018-03-27 PROCEDURE — 88304 TISSUE EXAM BY PATHOLOGIST: CPT

## 2018-03-27 PROCEDURE — 36000050 ZZH SURGERY LEVEL 2 1ST 30 MIN: Performed by: SURGERY

## 2018-03-27 PROCEDURE — 37000008 ZZH ANESTHESIA TECHNICAL FEE, 1ST 30 MIN: Performed by: SURGERY

## 2018-03-27 PROCEDURE — 25000128 H RX IP 250 OP 636: Performed by: SURGERY

## 2018-03-27 RX ORDER — ONDANSETRON 2 MG/ML
INJECTION INTRAMUSCULAR; INTRAVENOUS PRN
Status: DISCONTINUED | OUTPATIENT
Start: 2018-03-27 | End: 2018-03-27

## 2018-03-27 RX ORDER — PROPOFOL 10 MG/ML
INJECTION, EMULSION INTRAVENOUS PRN
Status: DISCONTINUED | OUTPATIENT
Start: 2018-03-27 | End: 2018-03-27

## 2018-03-27 RX ORDER — CEFAZOLIN SODIUM 2 G/100ML
2 INJECTION, SOLUTION INTRAVENOUS
Status: COMPLETED | OUTPATIENT
Start: 2018-03-27 | End: 2018-03-27

## 2018-03-27 RX ORDER — OXYCODONE AND ACETAMINOPHEN 5; 325 MG/1; MG/1
1 TABLET ORAL 2 TIMES DAILY PRN
Qty: 18 TABLET | Refills: 0 | Status: SHIPPED | OUTPATIENT
Start: 2018-03-27 | End: 2018-04-18

## 2018-03-27 RX ORDER — NALOXONE HYDROCHLORIDE 0.4 MG/ML
.1-.4 INJECTION, SOLUTION INTRAMUSCULAR; INTRAVENOUS; SUBCUTANEOUS
Status: DISCONTINUED | OUTPATIENT
Start: 2018-03-27 | End: 2018-03-27 | Stop reason: HOSPADM

## 2018-03-27 RX ORDER — ACETAMINOPHEN 10 MG/ML
INJECTION, SOLUTION INTRAVENOUS PRN
Status: DISCONTINUED | OUTPATIENT
Start: 2018-03-27 | End: 2018-03-27

## 2018-03-27 RX ORDER — MEPERIDINE HYDROCHLORIDE 50 MG/ML
12.5 INJECTION INTRAMUSCULAR; INTRAVENOUS; SUBCUTANEOUS
Status: DISCONTINUED | OUTPATIENT
Start: 2018-03-27 | End: 2018-03-27 | Stop reason: HOSPADM

## 2018-03-27 RX ORDER — MAGNESIUM HYDROXIDE 1200 MG/15ML
LIQUID ORAL PRN
Status: DISCONTINUED | OUTPATIENT
Start: 2018-03-27 | End: 2018-03-27 | Stop reason: HOSPADM

## 2018-03-27 RX ORDER — PROPOFOL 10 MG/ML
INJECTION, EMULSION INTRAVENOUS CONTINUOUS PRN
Status: DISCONTINUED | OUTPATIENT
Start: 2018-03-27 | End: 2018-03-27

## 2018-03-27 RX ORDER — ACETAMINOPHEN 325 MG/1
650 TABLET ORAL 4 TIMES DAILY
Refills: 0 | COMMUNITY
Start: 2018-03-27 | End: 2018-04-18

## 2018-03-27 RX ORDER — KETOROLAC TROMETHAMINE 30 MG/ML
INJECTION, SOLUTION INTRAMUSCULAR; INTRAVENOUS PRN
Status: DISCONTINUED | OUTPATIENT
Start: 2018-03-27 | End: 2018-03-27

## 2018-03-27 RX ORDER — FENTANYL CITRATE 50 UG/ML
INJECTION, SOLUTION INTRAMUSCULAR; INTRAVENOUS PRN
Status: DISCONTINUED | OUTPATIENT
Start: 2018-03-27 | End: 2018-03-27

## 2018-03-27 RX ORDER — HYDROMORPHONE HYDROCHLORIDE 1 MG/ML
.3-.5 INJECTION, SOLUTION INTRAMUSCULAR; INTRAVENOUS; SUBCUTANEOUS EVERY 10 MIN PRN
Status: DISCONTINUED | OUTPATIENT
Start: 2018-03-27 | End: 2018-03-27 | Stop reason: HOSPADM

## 2018-03-27 RX ORDER — ONDANSETRON 4 MG/1
4 TABLET, ORALLY DISINTEGRATING ORAL EVERY 30 MIN PRN
Status: DISCONTINUED | OUTPATIENT
Start: 2018-03-27 | End: 2018-03-27 | Stop reason: HOSPADM

## 2018-03-27 RX ORDER — DEXAMETHASONE SODIUM PHOSPHATE 4 MG/ML
4 INJECTION, SOLUTION INTRA-ARTICULAR; INTRALESIONAL; INTRAMUSCULAR; INTRAVENOUS; SOFT TISSUE EVERY 10 MIN PRN
Status: DISCONTINUED | OUTPATIENT
Start: 2018-03-27 | End: 2018-03-27 | Stop reason: HOSPADM

## 2018-03-27 RX ORDER — SODIUM CHLORIDE, SODIUM LACTATE, POTASSIUM CHLORIDE, CALCIUM CHLORIDE 600; 310; 30; 20 MG/100ML; MG/100ML; MG/100ML; MG/100ML
INJECTION, SOLUTION INTRAVENOUS CONTINUOUS
Status: DISCONTINUED | OUTPATIENT
Start: 2018-03-27 | End: 2018-03-27 | Stop reason: HOSPADM

## 2018-03-27 RX ORDER — LIDOCAINE 40 MG/G
CREAM TOPICAL
Status: DISCONTINUED | OUTPATIENT
Start: 2018-03-27 | End: 2018-03-27 | Stop reason: HOSPADM

## 2018-03-27 RX ORDER — KETAMINE HYDROCHLORIDE 50 MG/ML
INJECTION, SOLUTION INTRAMUSCULAR; INTRAVENOUS PRN
Status: DISCONTINUED | OUTPATIENT
Start: 2018-03-27 | End: 2018-03-27

## 2018-03-27 RX ORDER — KETOROLAC TROMETHAMINE 30 MG/ML
30 INJECTION, SOLUTION INTRAMUSCULAR; INTRAVENOUS EVERY 6 HOURS PRN
Status: DISCONTINUED | OUTPATIENT
Start: 2018-03-27 | End: 2018-03-27 | Stop reason: HOSPADM

## 2018-03-27 RX ORDER — HYDROCODONE BITARTRATE AND ACETAMINOPHEN 5; 325 MG/1; MG/1
1 TABLET ORAL
Status: DISCONTINUED | OUTPATIENT
Start: 2018-03-27 | End: 2018-03-27 | Stop reason: HOSPADM

## 2018-03-27 RX ORDER — FENTANYL CITRATE 50 UG/ML
25-50 INJECTION, SOLUTION INTRAMUSCULAR; INTRAVENOUS
Status: DISCONTINUED | OUTPATIENT
Start: 2018-03-27 | End: 2018-03-27 | Stop reason: HOSPADM

## 2018-03-27 RX ORDER — ONDANSETRON 2 MG/ML
4 INJECTION INTRAMUSCULAR; INTRAVENOUS EVERY 30 MIN PRN
Status: DISCONTINUED | OUTPATIENT
Start: 2018-03-27 | End: 2018-03-27 | Stop reason: HOSPADM

## 2018-03-27 RX ORDER — FENTANYL CITRATE 50 UG/ML
25-50 INJECTION, SOLUTION INTRAMUSCULAR; INTRAVENOUS EVERY 5 MIN PRN
Status: DISCONTINUED | OUTPATIENT
Start: 2018-03-27 | End: 2018-03-27 | Stop reason: HOSPADM

## 2018-03-27 RX ORDER — LIDOCAINE HYDROCHLORIDE 20 MG/ML
INJECTION, SOLUTION INFILTRATION; PERINEURAL PRN
Status: DISCONTINUED | OUTPATIENT
Start: 2018-03-27 | End: 2018-03-27

## 2018-03-27 RX ORDER — BUPIVACAINE HYDROCHLORIDE 2.5 MG/ML
INJECTION, SOLUTION EPIDURAL; INFILTRATION; INTRACAUDAL PRN
Status: DISCONTINUED | OUTPATIENT
Start: 2018-03-27 | End: 2018-03-27 | Stop reason: HOSPADM

## 2018-03-27 RX ORDER — IBUPROFEN 600 MG/1
600 TABLET, FILM COATED ORAL 4 TIMES DAILY
Refills: 0 | COMMUNITY
Start: 2018-03-27 | End: 2018-04-18

## 2018-03-27 RX ADMIN — BUPIVACAINE HYDROCHLORIDE 30 ML: 2.5 INJECTION, SOLUTION EPIDURAL; INFILTRATION; INTRACAUDAL; PERINEURAL at 10:00

## 2018-03-27 RX ADMIN — ACETAMINOPHEN 1000 MG: 10 INJECTION, SOLUTION INTRAVENOUS at 09:56

## 2018-03-27 RX ADMIN — ONDANSETRON HYDROCHLORIDE 4 MG: 2 SOLUTION INTRAMUSCULAR; INTRAVENOUS at 09:33

## 2018-03-27 RX ADMIN — FENTANYL CITRATE 50 MCG: 50 INJECTION, SOLUTION INTRAMUSCULAR; INTRAVENOUS at 09:32

## 2018-03-27 RX ADMIN — KETOROLAC TROMETHAMINE 30 MG: 30 INJECTION, SOLUTION INTRAMUSCULAR at 09:54

## 2018-03-27 RX ADMIN — FENTANYL CITRATE 50 MCG: 50 INJECTION, SOLUTION INTRAMUSCULAR; INTRAVENOUS at 09:50

## 2018-03-27 RX ADMIN — MIDAZOLAM HYDROCHLORIDE 2 MG: 1 INJECTION, SOLUTION INTRAMUSCULAR; INTRAVENOUS at 09:30

## 2018-03-27 RX ADMIN — PROPOFOL 200 MG: 10 INJECTION, EMULSION INTRAVENOUS at 09:33

## 2018-03-27 RX ADMIN — SODIUM CHLORIDE, SODIUM LACTATE, POTASSIUM CHLORIDE, AND CALCIUM CHLORIDE: 600; 310; 30; 20 INJECTION, SOLUTION INTRAVENOUS at 08:06

## 2018-03-27 RX ADMIN — SODIUM CHLORIDE 100 ML: 900 IRRIGANT IRRIGATION at 10:05

## 2018-03-27 RX ADMIN — PROPOFOL 200 MCG/KG/MIN: 10 INJECTION, EMULSION INTRAVENOUS at 09:39

## 2018-03-27 RX ADMIN — LIDOCAINE HYDROCHLORIDE 100 MG: 20 INJECTION, SOLUTION INFILTRATION; PERINEURAL at 09:33

## 2018-03-27 RX ADMIN — KETAMINE HYDROCHLORIDE 30 MG: 50 INJECTION, SOLUTION INTRAMUSCULAR; INTRAVENOUS at 09:32

## 2018-03-27 RX ADMIN — FENTANYL CITRATE 50 MCG: 50 INJECTION, SOLUTION INTRAMUSCULAR; INTRAVENOUS at 10:05

## 2018-03-27 RX ADMIN — SUCCINYLCHOLINE CHLORIDE 200 MG: 20 INJECTION, SOLUTION INTRAMUSCULAR; INTRAVENOUS at 09:33

## 2018-03-27 RX ADMIN — CEFAZOLIN SODIUM 2 G: 2 INJECTION, SOLUTION INTRAVENOUS at 09:30

## 2018-03-27 NOTE — INTERVAL H&P NOTE
The history and physical has been reviewed and the patient has been examined.  There are no interim changes to the patient's history or physical condition.    Francisco Javier Suero MD

## 2018-03-27 NOTE — IP AVS SNAPSHOT
[facility]    [address]    [place] [address]    Phone:  [phone]    Fax:  [phone]                                       After Visit Summary   3/27/2018    Berry Del Cid    MRN: [id number]           After Visit Summary Signature Page     I have received my discharge instructions, and my questions have been answered. I have discussed any challenges I see with this plan with the nurse or doctor.    ..........................................................................................................................................  Patient/Patient Representative Signature      ..........................................................................................................................................  Patient Representative Print Name and Relationship to Patient    ..................................................               ................................................  Date                                            Time    ..........................................................................................................................................  Reviewed by Signature/Title    ...................................................              ..............................................  Date                                                            Time

## 2018-03-27 NOTE — OR NURSING
PACU Transfer Note    Berry Del Cid was transferred to DSU via cart.  Equipment used for transport:  none.  Accompanied by:  RN  Report to RIZWAN Nunez    PACU Respiratory Event Documentation     1) Episodes of Apnea greater than or equal to 10 seconds: 0    2) Bradypnea - less than 8 breaths per minute: 0    3) Pain score on 0 to 10 scale: 0    4) Pain-sedation mismatch (yes or no): no    5) Repeated 02 desaturation less than 90% (yes or no): no    Anesthesia notified? (yes or no): no    Any of the above events occuring repeatedly in separate 30 minute intervals may be considered recurrent PACU respiratory events.    Patient stable and meets phase 1 discharge criteria for transport from PACU.

## 2018-03-27 NOTE — ANESTHESIA POSTPROCEDURE EVALUATION
Patient: Berry Del Cid    Procedure(s):  Pilonidal Cystectomy - Wound Class: III-Contaminated    Diagnosis:pilonidal cyst  Diagnosis Additional Information: No value filed.    Anesthesia Type:  General, ETT    Note:  Anesthesia Post Evaluation    Patient location during evaluation: PACU  Patient participation: Able to fully participate in evaluation  Level of consciousness: awake and alert  Pain management: adequate  Airway patency: patent  Cardiovascular status: acceptable  Respiratory status: acceptable  Hydration status: acceptable  PONV: none     Anesthetic complications: None          Last vitals:  Vitals:    03/27/18 1055 03/27/18 1100 03/27/18 1115   BP: 126/84 118/78 131/88   Pulse:      Resp: 17     Temp: 97.7  F (36.5  C)     SpO2: 94% 95% 95%         Electronically Signed By: Jhonatan Snell DO  March 27, 2018  2:36 PM

## 2018-03-27 NOTE — ANESTHESIA PREPROCEDURE EVALUATION
Anesthesia Evaluation     .             ROS/MED HX    ENT/Pulmonary:  - neg pulmonary ROS     Neurologic:  - neg neurologic ROS     Cardiovascular:  - neg cardiovascular ROS       METS/Exercise Tolerance:     Hematologic:  - neg hematologic  ROS       Musculoskeletal:  - neg musculoskeletal ROS       GI/Hepatic:  - neg GI/hepatic ROS       Renal/Genitourinary:  - ROS Renal section negative       Endo:  - neg endo ROS       Psychiatric:  - neg psychiatric ROS       Infectious Disease:  - neg infectious disease ROS       Malignancy:      - no malignancy   Other:    (+) No chance of pregnancy C-spine cleared: N/A, no H/O Chronic Pain,no other significant disability                    Physical Exam  Normal systems: cardiovascular, pulmonary and dental    Airway   Mallampati: II  TM distance: >3 FB  Neck ROM: full    Dental     Cardiovascular       Pulmonary                     Anesthesia Plan      History & Physical Review      ASA Status:  2 .    NPO Status:  > 6 hours    Plan for General and ETT with Intravenous and Propofol induction. Maintenance will be TIVA.    PONV prophylaxis:  Ondansetron (or other 5HT-3) and Dexamethasone or Solumedrol       Postoperative Care  Postoperative pain management:  IV analgesics and Oral pain medications.      Consents  Anesthetic plan, risks, benefits and alternatives discussed with: .  Use of blood products discussed: No .   .                         .

## 2018-03-27 NOTE — IP AVS SNAPSHOT
MRN:[id number]                      After Visit Summary   3/27/2018    Berry Del Cid    MRN: [id number]           Thank you!     Thank you for choosing [facility] for your care. Our goal is always to provide you with excellent care. Hearing back from our patients is one way we can continue to improve our services. Please take a few minutes to complete the written survey that you may receive in the mail after you visit with us. Thank you!        Patient Information     Date Of Birth          1990        Designated Caregiver       Most Recent Value    Caregiver    Will someone help with your care after discharge? yes    Name of designated caregiver wife      About your hospital stay     You were admitted on:  March 27, 2018 You last received care in the:  [facility]    You were discharged on:  March 27, 2018       Who to Call     For medical emergencies, please call 911.  For non-urgent questions about your medical care, please call your primary care provider or clinic, None  For questions related to your surgery, please call your surgery clinic        Attending Provider     Provider Specialty    Francisco Javier Suero MD Surgery       Primary Care Provider Fax #    Physician No Ref-Primary [phone]      After Care Instructions     Diet Instructions       Resume pre-procedure diet            Dressing       Remove entire dressing tomorrow and then shower daily. NS wet-to-dry dressing changes twice daily.            Ice to affected area       Ice to operative site PRN            No driving or operating machinery        until the day after procedure                  Your next 10 appointments already scheduled     Apr 04, 2018  3:30 PM CDT   Return Visit with Francisco Javier Suero MD   [facility] ([facility])    [address]  [facility] [id number] [phone]              Pending Results     No orders found from 3/25/2018 to  "3/28/2018.            Admission Information     Date & Time Provider Department Dept. Phone    3/27/2018 Francisco Javier Suero MD [facility] [phone]      Your Vitals Were     Blood Pressure Pulse Temperature Respirations Pulse Oximetry       118/78 70 97.7  F (36.5  C) 17 94%       MyChart Information     [employer] lets you send messages to your doctor, view your test results, renew your prescriptions, schedule appointments and more. To sign up, go to [email or web address] . Click on \"Log in\" on the left side of the screen, which will take you to the Welcome page. Then click on \"Sign up Now\" on the right side of the page.     You will be asked to enter the access code listed below, as well as some personal information. Please follow the directions to create your username and password.     Your access code is: [email or web address]  Expires: 2018  3:48 PM     Your access code will  in 90 days. If you need help or a new code, please call your [facility] clinic or [phone].        Care EveryWhere ID     This is your Care EveryWhere ID. This could be used by other organizations to access your [facility] medical records  [id number]        Equal Access to Services     JEFERSON PEREZ : Hadii amanda lazo hadasho Soomaali, waaxda luqadaha, qaybta kaalmada adeegyada, waxay gwyn jason. So [facility] [phone].    ATENCIÓN: Si habla español, tiene a maciel disposición servicios gratuitos de asistencia lingüística. Llame al [phone].    We comply with applicable federal civil rights laws and [place] laws. We do not discriminate on the basis of race, color, national origin, age, disability, sex, sexual orientation, or gender identity.               Review of your medicines      UNREVIEWED medicines. Ask your doctor about these medicines        Dose / Directions    amoxicillin-clavulanate 875-125 MG per tablet   Commonly known as:  AUGMENTIN   Used for:  Pilonidal cyst        Dose:  1 tablet "   Take 1 tablet by mouth 2 times daily for 10 days   Quantity:  20 tablet   Refills:  0         START taking        Dose / Directions    acetaminophen 325 MG tablet   Commonly known as:  TYLENOL   Used for:  Infected pilonidal cyst        Dose:  650 mg   Take 2 tablets (650 mg) by mouth 4 times daily   Refills:  0       ibuprofen 600 MG tablet   Commonly known as:  ADVIL/MOTRIN   Used for:  Infected pilonidal cyst        Dose:  600 mg   Take 1 tablet (600 mg) by mouth 4 times daily   Refills:  0       oxyCODONE-acetaminophen 5-325 MG per tablet   Commonly known as:  PERCOCET   Used for:  Infected pilonidal cyst        Dose:  1 tablet   Take 1 tablet by mouth 2 times daily as needed for pain (before dressing changes)   Quantity:  18 tablet   Refills:  0            Where to get your medicines      Some of these will need a paper prescription and others can be bought over the counter. Ask your nurse if you have questions.     Bring a paper prescription for each of these medications     oxyCODONE-acetaminophen 5-325 MG per tablet       You don't need a prescription for these medications     acetaminophen 325 MG tablet    ibuprofen 600 MG tablet                Protect others around you: Learn how to safely use, store and throw away your medicines at www.disposemymeds.org.        Information about OPIOIDS     PRESCRIPTION OPIOIDS: WHAT YOU NEED TO KNOW    Prescription opioids can be used to help relieve moderate to severe pain and are often prescribed following a surgery or injury, or for certain health conditions. These medications can be an important part of treatment but also come with serious risks. It is important to work with your health care provider to make sure you are getting the safest, most effective care.    WHAT ARE THE RISKS AND SIDE EFFECTS OF OPIOID USE?  Prescription opioids carry serious risks of addiction and overdose, especially with prolonged use. An opioid overdose, often marked by slowed breathing  can cause sudden death. The use of prescription opioids can have a number of side effects as well, even when taken as directed:      Tolerance - meaning you might need to take more of a medication for the same pain relief    Physical dependence - meaning you have symptoms of withdrawal when a medication is stopped    Increased sensitivity to pain    Constipation    Nausea, vomiting, and dry mouth    Sleepiness and dizziness    Confusion    Depression    Low levels of testosterone that can result in lower sex drive, energy, and strength    Itching and sweating    RISKS ARE GREATER WITH:    History of drug misuse, substance use disorder, or overdose    Mental health conditions (such as depression or anxiety)    Sleep apnea    Older age (65 years or older)    Pregnancy    Avoid alcohol while taking prescription opioids.   Also, unless specifically advised by your health care provider, medications to avoid include:    Benzodiazepines (such as Xanax or Valium)    Muscle relaxants (such as Soma or Flexeril)    Hypnotics (such as Ambien or Lunesta)    Other prescription opioids    KNOW YOUR OPTIONS:  Talk to your health care provider about ways to manage your pain that do not involve prescription opioids. Some of these options may actually work better and have fewer risks and side effects:    Pain relievers such as acetaminophen, ibuprofen, and naproxen    Some medications that are also used for depression or seizures    Physical therapy and exercise    Cognitive behavioral therapy, a psychological, goal-directed approach, in which patients learn how to modify physical, behavioral, and emotional triggers of pain and stress    IF YOU ARE PRESCRIBED OPIOIDS FOR PAIN:    Never take opioids in greater amounts or more often than prescribed    Follow up with your primary health care provider and work together to create a plan on how to manage your pain.    Talk about ways to help manage your pain that do not involve prescription  opioids    Talk about all concerns and side effects    Help prevent misuse and abuse    Never sell or share prescription opioids    Never use another person's prescription opioids    Store prescription opioids in a secure place and out of reach of others (this may include visitors, children, friends, and family)    Visit www.cdc.gov/drugoverdose to learn about risks of opioid abuse and overdose    If you believe you may be struggling with addiction, tell your health care provider and ask for guidance or call [facility]'s National Helpline at 3-871-431-HELP    LEARN MORE / www.cdc.gov/drugoverdose/prescribing/guideline.html    Safely dispose of unused prescription opioids: Find your local drug take-back programs and more information about the importance of safe disposal at www.doseofreality.[place].gov             Medication List: This is a list of all your medications and when to take them. Check marks below indicate your daily home schedule. Keep this list as a reference.      Medications           Morning Afternoon Evening Bedtime As Needed    acetaminophen 325 MG tablet   Commonly known as:  TYLENOL   Take 2 tablets (650 mg) by mouth 4 times daily                                amoxicillin-clavulanate 875-125 MG per tablet   Commonly known as:  AUGMENTIN   Take 1 tablet by mouth 2 times daily for 10 days                                ibuprofen 600 MG tablet   Commonly known as:  ADVIL/MOTRIN   Take 1 tablet (600 mg) by mouth 4 times daily                                oxyCODONE-acetaminophen 5-325 MG per tablet   Commonly known as:  PERCOCET   Take 1 tablet by mouth 2 times daily as needed for pain (before dressing changes)

## 2018-03-27 NOTE — OP NOTE
Procedure Note  Pre/Post Operative Diagnosis:   Pilonidal cyst    Procedure:    Pilonidal cystectomy    Surgeon: Francisco Javier Suero MD FACS    General Anesthesia: Dr Channing Holt CRNA    Indication for the procedure:    This is a 27 year old male patient  with pilonidal cyst that has been infected twice. Patient has a midline pit with hair and just to right of midline is drainage area with hypertrophic granulation.      Procedure:   The area was prepped and draped in usual sterile fashion  . The patient was prone.  After, adequate local anesthesia,an elliptical skin incision was made to encompass the pilonidal tract which was probed between pit and sinus opening. Hemostasis obtained with cautery. The wound is 4 x 1.5 x 2.2 cm deep.    A NS wet-to-dry dressing was applied. Patient taken to PACU in stable condition.    Francisco Javier Suero MD FACS

## 2018-03-29 ENCOUNTER — MYC MEDICAL ADVICE (OUTPATIENT)
Dept: SURGERY | Facility: OTHER | Age: 28
End: 2018-03-29

## 2018-04-04 ENCOUNTER — OFFICE VISIT (OUTPATIENT)
Dept: SURGERY | Facility: OTHER | Age: 28
End: 2018-04-04
Attending: SURGERY
Payer: COMMERCIAL

## 2018-04-04 VITALS
BODY MASS INDEX: 32.58 KG/M2 | DIASTOLIC BLOOD PRESSURE: 84 MMHG | SYSTOLIC BLOOD PRESSURE: 118 MMHG | WEIGHT: 220 LBS | HEIGHT: 69 IN

## 2018-04-04 DIAGNOSIS — Z98.890 POSTOPERATIVE STATE: Primary | ICD-10-CM

## 2018-04-04 PROCEDURE — 99024 POSTOP FOLLOW-UP VISIT: CPT | Performed by: SURGERY

## 2018-04-04 ASSESSMENT — PAIN SCALES - GENERAL: PAINLEVEL: MILD PAIN (2)

## 2018-04-04 NOTE — MR AVS SNAPSHOT
After Visit Summary   4/4/2018    Berry Del Cid    MRN: [id number]           Patient Information     Date Of Birth          1990        Visit Information        Provider Department      4/4/2018 3:30 PM Francisco Javier Suero MD [facility]        Today's Diagnoses     Postoperative state    -  1       Follow-ups after your visit        Follow-up notes from your care team     Return in about 2 weeks (around 4/18/2018) for post-op wound check.      Your next 10 appointments already scheduled     Apr 18, 2018  3:30 PM CDT   Return Visit with Francisco Javier Suero MD   [facility] ([facility])    [address]  [place] [address] [phone]              Who to contact     If you have questions or need follow up information about today's clinic visit or your schedule please contact [facility] directly at [phone].  Normal or non-critical lab and imaging results will be communicated to you by [employer], letter or phone within 4 business days after the clinic has received the results. If you do not hear from us within 7 days, please contact the clinic through [employer] or phone. If you have a critical or abnormal lab result, we will notify you by phone as soon as possible.  Submit refill requests through [employer] or call your pharmacy and they will forward the refill request to us. Please allow 3 business days for your refill to be completed.          Additional Information About Your Visit        MyChart Information     [employer] gives you secure access to your electronic health record. If you see a primary care provider, you can also send messages to your care team and make appointments. If you have questions, please call your primary care clinic.  If you do not have a primary care provider, please call [phone] and they will assist you.        Care EveryWhere ID     This is your Care EveryWhere ID. This could  "be used by other organizations to access your [facility] medical records  [id number]        Your Vitals Were     Height BMI (Body Mass Index)                5' 9\" (1.753 m) 32.49 kg/m2           Blood Pressure from Last 3 Encounters:   04/04/18 118/84   03/27/18 131/88   03/21/18 136/80    Weight from Last 3 Encounters:   04/04/18 220 lb (99.8 kg)   03/21/18 220 lb 6.4 oz (100 kg)   03/20/18 220 lb (99.8 kg)              Today, you had the following     No orders found for display       Primary Care Provider Fax #    Physician No Ref-Primary [phone]       No address on file        Equal Access to Services     JEFERSON PEREZ : Daren Mcmanus, maribell muhammad, kahlil moore, be arteaga . So [facility] [phone].    ATENCIÓN: Si habla español, tiene a maciel disposición servicios gratuitos de asistencia lingüística. Llame al [phone].    We comply with applicable federal civil rights laws and [place] laws. We do not discriminate on the basis of race, color, national origin, age, disability, sex, sexual orientation, or gender identity.            Thank you!     Thank you for choosing [facility]  for your care. Our goal is always to provide you with excellent care. Hearing back from our patients is one way we can continue to improve our services. Please take a few minutes to complete the written survey that you may receive in the mail after your visit with us. Thank you!             Your Updated Medication List - Protect others around you: Learn how to safely use, store and throw away your medicines at www.disposemymeds.org.          This list is accurate as of 4/4/18  3:43 PM.  Always use your most recent med list.                   Brand Name Dispense Instructions for use Diagnosis    acetaminophen 325 MG tablet    TYLENOL     Take 2 tablets (650 mg) by mouth 4 times daily    Infected pilonidal cyst       ibuprofen 600 MG tablet    " ADVIL/MOTRIN     Take 1 tablet (600 mg) by mouth 4 times daily    Infected pilonidal cyst       oxyCODONE-acetaminophen 5-325 MG per tablet    PERCOCET    18 tablet    Take 1 tablet by mouth 2 times daily as needed for pain (before dressing changes)    Infected pilonidal cyst

## 2018-04-04 NOTE — PROGRESS NOTES
"Subjective:  This is a follow up after pilonidal cystectomy on 3/27/18. The patient has no complaints.     Objective:/84 (BP Location: Right arm, Patient Position: Sitting, Cuff Size: Adult Large)  Ht 5' 9\" (1.753 m)  Wt 220 lb (99.8 kg)  BMI 32.49 kg/m2   The incision is granulating well without erythema.     Assessment:   Doing well after pilonidal cystectomy    Plan:  NS wet-to-dry dressings BID and follow-up in two weeks  May return to work 4/9/18 without restrictions    "

## 2018-04-09 ENCOUNTER — MYC MEDICAL ADVICE (OUTPATIENT)
Dept: SURGERY | Facility: OTHER | Age: 28
End: 2018-04-09

## 2018-04-09 DIAGNOSIS — Z98.890 POSTOPERATIVE STATE: Primary | ICD-10-CM

## 2018-04-10 ENCOUNTER — TELEPHONE (OUTPATIENT)
Dept: SURGERY | Facility: OTHER | Age: 28
End: 2018-04-10

## 2018-04-11 NOTE — TELEPHONE ENCOUNTER
Prescription was faxed to [facility]'s per patient's request.  Deidre Pineda LPN  4/11/2018  8:41 AM

## 2018-04-18 ENCOUNTER — OFFICE VISIT (OUTPATIENT)
Dept: SURGERY | Facility: OTHER | Age: 28
End: 2018-04-18
Attending: SURGERY
Payer: COMMERCIAL

## 2018-04-18 VITALS
TEMPERATURE: 97.5 F | BODY MASS INDEX: 33.14 KG/M2 | DIASTOLIC BLOOD PRESSURE: 82 MMHG | SYSTOLIC BLOOD PRESSURE: 130 MMHG | WEIGHT: 224.4 LBS

## 2018-04-18 DIAGNOSIS — L05.91 INFECTED PILONIDAL CYST: ICD-10-CM

## 2018-04-18 DIAGNOSIS — Z98.890 POSTOPERATIVE STATE: Primary | ICD-10-CM

## 2018-04-18 PROCEDURE — 99212 OFFICE O/P EST SF 10 MIN: CPT | Performed by: SURGERY

## 2018-04-18 RX ORDER — MAGNESIUM HYDROXIDE 1200 MG/15ML
LIQUID ORAL
Qty: 1000 ML | Refills: 3 | Status: SHIPPED | OUTPATIENT
Start: 2018-04-18

## 2018-04-18 ASSESSMENT — PAIN SCALES - GENERAL: PAINLEVEL: NO PAIN (0)

## 2018-04-18 NOTE — PROGRESS NOTES
Subjective:  This is a follow up after pilonidal cystectomy on 3/27/18. The patient has no complaints.     Objective: /82 (BP Location: Right arm, Patient Position: Chair, Cuff Size: Adult Large)  Temp 97.5  F (36.4  C) (Tympanic)  Wt 224 lb 6.4 oz (101.8 kg)  BMI 33.14 kg/m2   The wound is healing well without erythema. It is 2 x 0.4 x 1 cm (deep). Fully granulated.    Assessment:   Doing well after pilonidal cystectomy    Plan:  Continue NS wet-to-dry BID and follow-up in two weeks.

## 2018-04-18 NOTE — MR AVS SNAPSHOT
After Visit Summary   4/18/2018    Berry Del Cid    MRN: [id number]           Patient Information     Date Of Birth          1990        Visit Information        Provider Department      4/18/2018 3:30 PM Francisco Javier Suero MD [facility]        Today's Diagnoses     Postoperative state    -  1    Infected pilonidal cyst           Follow-ups after your visit        Follow-up notes from your care team     Return in about 2 weeks (around 5/2/2018) for wound check.      Your next 10 appointments already scheduled     May 02, 2018  3:30 PM CDT   Return Visit with Francisco Javier Suero MD   [facility]    [address]  [facility] [address] [phone]              Who to contact     If you have questions or need follow up information about today's clinic visit or your schedule please contact [facility] directly at [phone].  Normal or non-critical lab and imaging results will be communicated to you by [employer], letter or phone within 4 business days after the clinic has received the results. If you do not hear from us within 7 days, please contact the clinic through [employer] or phone. If you have a critical or abnormal lab result, we will notify you by phone as soon as possible.  Submit refill requests through [employer] or call your pharmacy and they will forward the refill request to us. Please allow 3 business days for your refill to be completed.          Additional Information About Your Visit        MyChart Information     [employer] gives you secure access to your electronic health record. If you see a primary care provider, you can also send messages to your care team and make appointments. If you have questions, please call your primary care clinic.  If you do not have a primary care provider, please call [phone] and they will assist you.        Care EveryWhere ID     This is your Care  EveryWhere ID. This could be used by other organizations to access your [facility] medical records  [id number]        Your Vitals Were     Temperature BMI (Body Mass Index)                97.5  F (36.4  C) (Tympanic) 33.14 kg/m2           Blood Pressure from Last 3 Encounters:   04/18/18 130/82   04/04/18 118/84   03/27/18 131/88    Weight from Last 3 Encounters:   04/18/18 224 lb 6.4 oz (101.8 kg)   04/04/18 220 lb (99.8 kg)   03/21/18 220 lb 6.4 oz (100 kg)              Today, you had the following     No orders found for display         Today's Medication Changes          These changes are accurate as of 4/18/18  3:44 PM.  If you have any questions, ask your nurse or doctor.               Start taking these medicines.        Dose/Directions    sodium chloride 0.9% (bottle) 0.9 % irrigation   Used for:  Postoperative state, Infected pilonidal cyst   Started by:  Francisco Javier Suero MD        Use for wet-to-dry dressings BID   Quantity:  1000 mL   Refills:  3            Where to get your medicines      These medications were sent to [facility] - [place], [place] - [address]  [address] [facility] [address]     Phone:  [phone]     sodium chloride 0.9% (bottle) 0.9 % irrigation                Primary Care Provider Fax #    Physician No Ref-Primary [phone]       No address on file        Equal Access to Services     ESTELA PEREZ : Hadii amanda Mcmanus, waaxda luhiadaha, qaybta kaalmaalice moore, be jason. So [facility] [phone].    ATENCIÓN: Si habla español, tiene a maciel disposición servicios gratuitos de asistencia lingüística. Llame al [phone].    We comply with applicable federal civil rights laws and [place] laws. We do not discriminate on the basis of race, color, national origin, age, disability, sex, sexual orientation, or gender identity.            Thank you!     Thank you for choosing [facility]   for your care. Our goal is always to provide you with excellent care. Hearing back from our patients is one way we can continue to improve our services. Please take a few minutes to complete the written survey that you may receive in the mail after your visit with us. Thank you!             Your Updated Medication List - Protect others around you: Learn how to safely use, store and throw away your medicines at www.disposemymeds.org.          This list is accurate as of 4/18/18  3:44 PM.  Always use your most recent med list.                   Brand Name Dispense Instructions for use Diagnosis    sodium chloride 0.9% (bottle) 0.9 % irrigation     1000 mL    Use for wet-to-dry dressings BID    Postoperative state, Infected pilonidal cyst

## 2018-04-18 NOTE — NURSING NOTE
Patient is here for a post op-no complaints at this time, no pain.   Naomi Darling LPN...................4/18/2018   3:21 PM

## 2018-04-19 ASSESSMENT — PATIENT HEALTH QUESTIONNAIRE - PHQ9: SUM OF ALL RESPONSES TO PHQ QUESTIONS 1-9: 0

## 2018-05-02 ENCOUNTER — OFFICE VISIT (OUTPATIENT)
Dept: SURGERY | Facility: OTHER | Age: 28
End: 2018-05-02
Attending: SURGERY
Payer: COMMERCIAL

## 2018-05-02 VITALS
WEIGHT: 224 LBS | BODY MASS INDEX: 33.18 KG/M2 | HEIGHT: 69 IN | DIASTOLIC BLOOD PRESSURE: 80 MMHG | SYSTOLIC BLOOD PRESSURE: 110 MMHG

## 2018-05-02 DIAGNOSIS — Z98.890 POSTOPERATIVE STATE: Primary | ICD-10-CM

## 2018-05-02 PROCEDURE — 99212 OFFICE O/P EST SF 10 MIN: CPT | Performed by: SURGERY

## 2018-05-02 ASSESSMENT — PAIN SCALES - GENERAL: PAINLEVEL: NO PAIN (0)

## 2018-05-02 NOTE — MR AVS SNAPSHOT
After Visit Summary   5/2/2018    Berry Del Cid    MRN: [id number]           Patient Information     Date Of Birth          1990        Visit Information        Provider Department      5/2/2018 3:30 PM Francisco Javier Suero MD [facility]        Today's Diagnoses     Postoperative state    -  1       Follow-ups after your visit        Follow-up notes from your care team     Return in about 3 weeks (around 5/23/2018) for Wound check.      Your next 10 appointments already scheduled     May 23, 2018 10:40 AM CDT   Return Visit with Francisco Javier Suero MD   [facility] ([facility])    [address]  [place] [address] [phone]              Who to contact     If you have questions or need follow up information about today's clinic visit or your schedule please contact [facility] directly at [phone].  Normal or non-critical lab and imaging results will be communicated to you by Ikanoshart, letter or phone within 4 business days after the clinic has received the results. If you do not hear from us within 7 days, please contact the clinic through Ikanoshart or phone. If you have a critical or abnormal lab result, we will notify you by phone as soon as possible.  Submit refill requests through Pure Focus or call your pharmacy and they will forward the refill request to us. Please allow 3 business days for your refill to be completed.          Additional Information About Your Visit        MyChart Information     Pure Focus gives you secure access to your electronic health record. If you see a primary care provider, you can also send messages to your care team and make appointments. If you have questions, please call your primary care clinic.  If you do not have a primary care provider, please call [phone] and they will assist you.        Care EveryWhere ID     This is your Care EveryWhere ID. This could be used  "by other organizations to access your [place] medical records  [id number]        Your Vitals Were     Height BMI (Body Mass Index)                5' 9\" (1.753 m) 33.08 kg/m2           Blood Pressure from Last 3 Encounters:   05/02/18 110/80   04/18/18 130/82   04/04/18 118/84    Weight from Last 3 Encounters:   05/02/18 224 lb (101.6 kg)   04/18/18 224 lb 6.4 oz (101.8 kg)   04/04/18 220 lb (99.8 kg)              Today, you had the following     No orders found for display       Primary Care Provider Fax #    Physician No Ref-Primary [phone]       No address on file        Equal Access to Services     JEFERSON PEREZ : Daren Mcmanus, maribell muhammad, kahlil moore, be arteaga . So [facility] [phone].    ATENCIÓN: Si habla español, tiene a maciel disposición servicios gratuitos de asistencia lingüística. Llame al [phone].    We comply with applicable federal civil rights laws and [place] laws. We do not discriminate on the basis of race, color, national origin, age, disability, sex, sexual orientation, or gender identity.            Thank you!     Thank you for choosing [facility] AND [facility]  for your care. Our goal is always to provide you with excellent care. Hearing back from our patients is one way we can continue to improve our services. Please take a few minutes to complete the written survey that you may receive in the mail after your visit with us. Thank you!             Your Updated Medication List - Protect others around you: Learn how to safely use, store and throw away your medicines at www.disposemymeds.org.          This list is accurate as of 5/2/18  4:10 PM.  Always use your most recent med list.                   Brand Name Dispense Instructions for use Diagnosis    sodium chloride 0.9% (bottle) 0.9 % irrigation     1000 mL    Use for wet-to-dry dressings BID    Postoperative state, Infected pilonidal cyst         "

## 2018-09-12 ENCOUNTER — OFFICE VISIT (OUTPATIENT)
Dept: FAMILY MEDICINE | Facility: OTHER | Age: 28
End: 2018-09-12
Attending: FAMILY MEDICINE
Payer: COMMERCIAL

## 2018-09-12 VITALS
WEIGHT: 222 LBS | SYSTOLIC BLOOD PRESSURE: 132 MMHG | DIASTOLIC BLOOD PRESSURE: 86 MMHG | BODY MASS INDEX: 32.78 KG/M2 | HEART RATE: 72 BPM

## 2018-09-12 DIAGNOSIS — L30.4 INTERTRIGO: Primary | ICD-10-CM

## 2018-09-12 PROCEDURE — 99213 OFFICE O/P EST LOW 20 MIN: CPT | Performed by: FAMILY MEDICINE

## 2018-09-12 RX ORDER — CLOTRIMAZOLE 1 %
CREAM (GRAM) TOPICAL 2 TIMES DAILY
Qty: 45 G | Refills: 1 | Status: SHIPPED | OUTPATIENT
Start: 2018-09-12

## 2018-09-12 ASSESSMENT — PAIN SCALES - GENERAL: PAINLEVEL: NO PAIN (0)

## 2018-09-12 NOTE — MR AVS SNAPSHOT
After Visit Summary   9/12/2018    Berry Del Cid    MRN: [id number]           Patient Information     Date Of Birth          1990        Visit Information        Provider Department      9/12/2018 3:15 PM Mike Fonseca MD [facility]        Today's Diagnoses     Intertrigo    -  1      Care Instructions    Wash and completely dry area between buttocks at least once daily  Apply clotrimazole twice daily until rash is gone plus a couple more days          Follow-ups after your visit        Who to contact     If you have questions or need follow up information about today's clinic visit or your schedule please contact [facility] directly at [phone].  Normal or non-critical lab and imaging results will be communicated to you by BuddyTVhart, letter or phone within 4 business days after the clinic has received the results. If you do not hear from us within 7 days, please contact the clinic through BuddyTVhart or phone. If you have a critical or abnormal lab result, we will notify you by phone as soon as possible.  Submit refill requests through Wellframe or call your pharmacy and they will forward the refill request to us. Please allow 3 business days for your refill to be completed.          Additional Information About Your Visit        MyChart Information     Wellframe gives you secure access to your electronic health record. If you see a primary care provider, you can also send messages to your care team and make appointments. If you have questions, please call your primary care clinic.  If you do not have a primary care provider, please call [phone] and they will assist you.        Care EveryWhere ID     This is your Care EveryWhere ID. This could be used by other organizations to access your [place] medical records  [id number]        Your Vitals Were     Pulse BMI (Body Mass Index)                72 32.78 kg/m2           Blood Pressure from Last  3 Encounters:   09/12/18 132/86   05/02/18 110/80   04/18/18 130/82    Weight from Last 3 Encounters:   09/12/18 222 lb (100.7 kg)   05/02/18 224 lb (101.6 kg)   04/18/18 224 lb 6.4 oz (101.8 kg)              Today, you had the following     No orders found for display         Today's Medication Changes          These changes are accurate as of 9/12/18 11:59 PM.  If you have any questions, ask your nurse or doctor.               Start taking these medicines.        Dose/Directions    clotrimazole 1 % cream   Commonly known as:  LOTRIMIN   Used for:  Intertrigo   Started by:  Mike Fonseca MD        Apply topically 2 times daily For 2 weeks   Quantity:  45 g   Refills:  1            Where to get your medicines      These medications were sent to [facility] [address] [facility], [place] - [address]  [address], [facility] [address]     Phone:  [phone]     clotrimazole 1 % cream                Primary Care Provider Fax #    Physician No Ref-Primary [phone]       No address on file        Equal Access to Services     ESTELA PEREZ AH: Hadii amanda henriquezo Somami, waaxda luqadaha, qaybta kaalmada aderashadyada, be jason. So [facility] [phone].    ATENCIÓN: Si habla español, tiene a maciel disposición servicios gratuitos de asistencia lingüística. Llame al [phone].    We comply with applicable federal civil rights laws and [place] laws. We do not discriminate on the basis of race, color, national origin, age, disability, sex, sexual orientation, or gender identity.            Thank you!     Thank you for choosing [facility] AND [facility]  for your care. Our goal is always to provide you with excellent care. Hearing back from our patients is one way we can continue to improve our services. Please take a few minutes to complete the written survey that you may receive in the mail after your visit with us. Thank you!              Your Updated Medication List - Protect others around you: Learn how to safely use, store and throw away your medicines at www.disposemymeds.org.          This list is accurate as of 9/12/18 11:59 PM.  Always use your most recent med list.                   Brand Name Dispense Instructions for use Diagnosis    clotrimazole 1 % cream    LOTRIMIN    45 g    Apply topically 2 times daily For 2 weeks    Intertrigo       sodium chloride 0.9% (bottle) 0.9 % irrigation     1000 mL    Use for wet-to-dry dressings BID    Postoperative state, Infected pilonidal cyst

## 2018-09-12 NOTE — NURSING NOTE
Patient is here to recheck his tailbone because he has been having drainage the last couple of days from the area where his cyst was removed.  Avis Reilly LPN .......9/12/2018 3:26 PM

## 2018-09-12 NOTE — PROGRESS NOTES
Nursing Notes:   Avis Reilly LPN  9/12/2018  3:29 PM  Signed  Patient is here to recheck his tailbone because he has been having drainage the last couple of days from the area where his cyst was removed.  Avis Reilly LPN .......9/12/2018 3:26 PM      SUBJECTIVE:  27 year old male presents for possible return of pilonidal cyst. He had cystectomy March 27 with Dr Suero and it healed up by the summer. Yesterday noticed some wetness in the buttocks area and was concerned about return of drainage. No mass noted.    REVIEW OF SYSTEMS:    Constitutional: no fever    Current Outpatient Prescriptions   Medication Sig Dispense Refill     sodium chloride 0.9%, bottle, 0.9 % irrigation Use for wet-to-dry dressings BID 1000 mL 3     No Known Allergies    OBJECTIVE:  /86 (BP Location: Right arm, Patient Position: Sitting, Cuff Size: Adult Large)  Pulse 72  Wt 222 lb (100.7 kg)  BMI 32.78 kg/m2    EXAM:  Skin: Incision from pilonidal cystectomy noted.  No fluctuance and no erythema.  There are no masses seen in the gluteal clefts or buttock area.  He does have a fungal rash where the buttocks touch extend into the perineum.  No groin rash noted.    ASSESSMENT/PLAN:    ICD-10-CM    1. Intertrigo L30.4 clotrimazole (LOTRIMIN) 1 % cream     He gets sweaty during work.  Appears that he has fungal rash in the skin folds between the buttocks.  Reassured this is not a pilonidal cyst.  Recommend clotrimazole twice daily to the rash until resolved.  Needs to keep the area dry as much as possible.  Discussed showering padding dry thoroughly and using a fan for complete moisture reduction.    F/U PRN    Mike Fonseca MD    This document was prepared using a combination of typing and voice generated software.  While every attempt was made for accuracy, spelling and grammatical errors may exist.

## 2018-09-12 NOTE — PATIENT INSTRUCTIONS
Wash and completely dry area between buttocks at least once daily  Apply clotrimazole twice daily until rash is gone plus a couple more days

## 2020-03-10 ENCOUNTER — HEALTH MAINTENANCE LETTER (OUTPATIENT)
Age: 30
End: 2020-03-10

## 2020-12-27 ENCOUNTER — HEALTH MAINTENANCE LETTER (OUTPATIENT)
Age: 30
End: 2020-12-27

## 2021-04-24 ENCOUNTER — HEALTH MAINTENANCE LETTER (OUTPATIENT)
Age: 31
End: 2021-04-24

## 2021-10-04 ENCOUNTER — HEALTH MAINTENANCE LETTER (OUTPATIENT)
Age: 31
End: 2021-10-04

## 2022-05-15 ENCOUNTER — HEALTH MAINTENANCE LETTER (OUTPATIENT)
Age: 32
End: 2022-05-15

## 2022-09-11 ENCOUNTER — HEALTH MAINTENANCE LETTER (OUTPATIENT)
Age: 32
End: 2022-09-11

## 2022-12-29 ENCOUNTER — ALLIED HEALTH/NURSE VISIT (OUTPATIENT)
Dept: FAMILY MEDICINE | Facility: OTHER | Age: 32
End: 2022-12-29
Attending: FAMILY MEDICINE
Payer: OTHER GOVERNMENT

## 2022-12-29 DIAGNOSIS — R05.9 COUGH: Primary | ICD-10-CM

## 2022-12-29 LAB — SARS-COV-2 RNA RESP QL NAA+PROBE: POSITIVE

## 2022-12-29 PROCEDURE — C9803 HOPD COVID-19 SPEC COLLECT: HCPCS

## 2022-12-29 PROCEDURE — U0003 INFECTIOUS AGENT DETECTION BY NUCLEIC ACID (DNA OR RNA); SEVERE ACUTE RESPIRATORY SYNDROME CORONAVIRUS 2 (SARS-COV-2) (CORONAVIRUS DISEASE [COVID-19]), AMPLIFIED PROBE TECHNIQUE, MAKING USE OF HIGH THROUGHPUT TECHNOLOGIES AS DESCRIBED BY CMS-2020-01-R: HCPCS | Mod: ZL

## (undated) DEVICE — BLADE CLIPPER 4406

## (undated) DEVICE — DRSG GAUZE 4X4" 3033

## (undated) DEVICE — SOL WATER 1500ML

## (undated) DEVICE — GLOVE PROTEXIS POWDER FREE SMT 8.0  2D72PT80X

## (undated) DEVICE — DRSG ABDOMINAL PAD UNSTERILE 5X9" 9190

## (undated) DEVICE — LIGHT HANDLES PLASTIC

## (undated) DEVICE — PACK MAJOR LAPAROTOMY LF SBA15MLFCA

## (undated) DEVICE — DECANTER VIAL 2006S

## (undated) DEVICE — PANTIES MESH LG/XLG 2PK 706M2

## (undated) DEVICE — ESU GROUND PAD ADULT W/CORD E7507

## (undated) DEVICE — GLOVE BIOGEL PI INDICATOR 8.0 LF 41680

## (undated) RX ORDER — ONDANSETRON 2 MG/ML
INJECTION INTRAMUSCULAR; INTRAVENOUS
Status: DISPENSED
Start: 2018-03-27

## (undated) RX ORDER — PROPOFOL 10 MG/ML
INJECTION, EMULSION INTRAVENOUS
Status: DISPENSED
Start: 2018-03-27

## (undated) RX ORDER — FENTANYL CITRATE 50 UG/ML
INJECTION, SOLUTION INTRAMUSCULAR; INTRAVENOUS
Status: DISPENSED
Start: 2018-03-27

## (undated) RX ORDER — CEFAZOLIN SODIUM 2 G/100ML
INJECTION, SOLUTION INTRAVENOUS
Status: DISPENSED
Start: 2018-03-27

## (undated) RX ORDER — KETAMINE HYDROCHLORIDE 50 MG/ML
INJECTION, SOLUTION INTRAMUSCULAR; INTRAVENOUS
Status: DISPENSED
Start: 2018-03-27

## (undated) RX ORDER — BUPIVACAINE HYDROCHLORIDE 2.5 MG/ML
INJECTION, SOLUTION EPIDURAL; INFILTRATION; INTRACAUDAL
Status: DISPENSED
Start: 2018-03-27

## (undated) RX ORDER — LIDOCAINE HYDROCHLORIDE 20 MG/ML
INJECTION, SOLUTION EPIDURAL; INFILTRATION; INTRACAUDAL; PERINEURAL
Status: DISPENSED
Start: 2018-03-27

## (undated) RX ORDER — KETOROLAC TROMETHAMINE 30 MG/ML
INJECTION, SOLUTION INTRAMUSCULAR; INTRAVENOUS
Status: DISPENSED
Start: 2018-03-27